# Patient Record
Sex: FEMALE | URBAN - NONMETROPOLITAN AREA
[De-identification: names, ages, dates, MRNs, and addresses within clinical notes are randomized per-mention and may not be internally consistent; named-entity substitution may affect disease eponyms.]

---

## 2023-01-13 ENCOUNTER — PROCEDURE VISIT (OUTPATIENT)
Dept: ENT CLINIC | Age: 43
End: 2023-01-13
Payer: MEDICAID

## 2023-01-13 DIAGNOSIS — H93.A2 PULSATILE TINNITUS OF LEFT EAR: Primary | ICD-10-CM

## 2023-01-13 PROCEDURE — 92550 TYMPANOMETRY & REFLEX THRESH: CPT | Performed by: AUDIOLOGIST

## 2023-01-13 PROCEDURE — 92553 AUDIOMETRY AIR & BONE: CPT | Performed by: AUDIOLOGIST

## 2023-01-13 NOTE — PROGRESS NOTES
History   Natan Nunez is a 38 y.o. female who presented to the clinic this date with complaints of decreased hearing in her left ear. She reported onset several years ago. She reported symptoms are worse in noise. She also noted pulsatile tinnitus in her left ear which resolves when pushing on her neck. She has had vascular studies which she reported were normal. She reported vertigo with initial onset but denied recent problems. Summary   Immitance measures are consistent with normal middle ear function bilaterally. Pure tone testing indicates normal hearing bilaterally. Results   Otoscopy:   Right: Clear EAC/Normal TM  Left: Clear EAC/Normal TM    Audiometry:   Right: Hearing WNL    Left: Hearing WNL           Tympanometry:    Right: Type A  Left: Type A    Acoustic Reflex Thresholds:     0.5 kHz 1 kHz 2 kHz  0.5 kHz 1 kHz 2 kHz   Right Ipsilateral  Probe Right, Stim Right Present Present Present Left Ipsilateral  Probe Left, Stim Left  Present  Present  Present   Left Contralateral  Probe Right, Stim Left Present Present Present Right Contralateral  Probe Left, Stim Right  Present  Present  Present       Plan   Results of today's testing were discussed with Ms. Judie Keenan and the following recommendations were made: Follow up with ENT as scheduled.         Audiogram and Acoustic Immittance

## 2023-02-09 ENCOUNTER — OFFICE VISIT (OUTPATIENT)
Dept: ENT CLINIC | Age: 43
End: 2023-02-09
Payer: MEDICAID

## 2023-02-09 VITALS
HEIGHT: 62 IN | BODY MASS INDEX: 28.89 KG/M2 | SYSTOLIC BLOOD PRESSURE: 122 MMHG | WEIGHT: 157 LBS | DIASTOLIC BLOOD PRESSURE: 70 MMHG

## 2023-02-09 DIAGNOSIS — H93.A2 PULSATILE TINNITUS OF LEFT EAR: Primary | ICD-10-CM

## 2023-02-09 PROCEDURE — G8484 FLU IMMUNIZE NO ADMIN: HCPCS | Performed by: PHYSICIAN ASSISTANT

## 2023-02-09 PROCEDURE — 4004F PT TOBACCO SCREEN RCVD TLK: CPT | Performed by: PHYSICIAN ASSISTANT

## 2023-02-09 PROCEDURE — G8427 DOCREV CUR MEDS BY ELIG CLIN: HCPCS | Performed by: PHYSICIAN ASSISTANT

## 2023-02-09 PROCEDURE — G8419 CALC BMI OUT NRM PARAM NOF/U: HCPCS | Performed by: PHYSICIAN ASSISTANT

## 2023-02-09 PROCEDURE — 99203 OFFICE O/P NEW LOW 30 MIN: CPT | Performed by: PHYSICIAN ASSISTANT

## 2023-02-09 RX ORDER — DEXTROAMPHETAMINE SACCHARATE, AMPHETAMINE ASPARTATE, DEXTROAMPHETAMINE SULFATE AND AMPHETAMINE SULFATE 5; 5; 5; 5 MG/1; MG/1; MG/1; MG/1
TABLET ORAL
COMMUNITY
Start: 2023-01-18

## 2023-02-09 ASSESSMENT — ENCOUNTER SYMPTOMS
FACIAL SWELLING: 0
EYE DISCHARGE: 0
SORE THROAT: 0
VOICE CHANGE: 0
RHINORRHEA: 0
SINUS PRESSURE: 0
EYE PAIN: 0
TROUBLE SWALLOWING: 0
SINUS PAIN: 0

## 2023-02-09 NOTE — PROGRESS NOTES
[facility] OTOLARYNGOLOGY/ENT  Ms. Maria Del Carmen Douglas is a pleasant 40-year-old  female that was referred by Dr. Manda Kimball due to problems with pulsatile tinnitus in the left ear. Patient reports that she initially underwent ultrasound of the carotids that demonstrated minimal disease with no stenosis. Due to this persisting she underwent audiology studies by [facility] on January 13, 2023. This demonstrated normal hearing with no deficit. Tympanogram was type A bilaterally. Patient reports that she experienced vertigo for about a week or 2 after the onset of the pulsatile tinnitus which was about 2-1/2 3 years ago. Since then she has had no issues with vertigo or any issues with headaches or vision changes. Allergies: Patient has no known allergies. Current Outpatient Medications   Medication Sig Dispense Refill    amphetamine-dextroamphetamine (ADDERALL) 20 MG tablet TAKE 1 TABLET BY MOUTH THREE TIMES A DAY FILL ON 1/18       No current facility-administered medications for this visit. Past Surgical History:   Procedure Laterality Date    CHOLECYSTECTOMY         Past Medical History:   Diagnosis Date    ADHD     Chronic pain        Family History   Problem Relation Age of Onset    Cancer Maternal Aunt     Cancer Paternal Grandfather        Social History     Tobacco Use    Smoking status: Never    Smokeless tobacco: Not on file   Substance Use Topics    Alcohol use: Not Currently           REVIEW OF SYSTEMS:  all other systems reviewed and are negative  Review of Systems   Constitutional:  Negative for chills and fever. HENT:  Negative for congestion, dental problem, ear discharge, ear pain, facial swelling, hearing loss, nosebleeds, postnasal drip, rhinorrhea, sinus pressure, sinus pain, sneezing, sore throat, tinnitus, trouble swallowing and voice change. Eyes:  Negative for pain and discharge. Neurological:  Negative for dizziness and headaches.          Comments:     PHYSICAL EXAM:    BP 122/70   Ht 5' 2\" (1.575 m)   Wt 157 lb (71.2 kg)   BMI 28.72 kg/m²   Body mass index is 28.72 kg/m². General Appearance: well developed  and well nourished  Head/ Face: normocephalic and atraumatic  Vocal Quality: good/ normal  Ears: Right Ear: External: external ears normal Otoscopy Ear Canal: canal clear Otoscopy TM: TM's normal and TM's mobile Left Ear: External: external ears normal Otoscopy Ear Canal: canal clear Otoscopy TM: TM's normal and TM's mobile  Hearing: grossly intact  Nose: nares normal and septum midline  Neck: supple and adenopathy none palpable  Thyroid: normal  Oral exam demonstrated the tongue to be midline with no abnormalities to the posterior pharynx. Patient was noted to have no evidence of carotid bruits to auscultation bilaterally. Assessment & Plan:    Problem List Items Addressed This Visit       Pulsatile tinnitus of left ear - Primary     Pulsatile tinnitus of the left ear  Plan: I have recommended an MRI of the IAC for further evaluation. I will call her the results with further recommendations to follow. If the MRI is negative, would consider adjusting her ADHD meds due to this being a stimulant. Relevant Orders    MRI IAC POSTERIOR FOSSA W WO CONTRAST       Orders Placed This Encounter   Procedures    MRI IAC POSTERIOR FOSSA W WO CONTRAST     Standing Status:   Future     Standing Expiration Date:   2/9/2024     Scheduling Instructions:      LMP     Order Specific Question:   STAT Creatinine as needed:     Answer:   No     Order Specific Question:   Reason for exam:     Answer:   Pulsatile tinnitus of the left ear     Order Specific Question:   What is the sedation requirement? Answer:   None       No orders of the defined types were placed in this encounter. Electronically signed by Connie Whitfield PA-C on 2/9/23 at 3:39 PM CST        Please note that this chart was generated using dragon dictation software.   Although every effort was made to ensure the accuracy of this automated transcription, some errors in transcription may have occurred.

## 2023-02-09 NOTE — ASSESSMENT & PLAN NOTE
Pulsatile tinnitus of the left ear  Plan: I have recommended an MRI of the IAC for further evaluation. I will call her the results with further recommendations to follow. If the MRI is negative, would consider adjusting her ADHD meds due to this being a stimulant.

## 2023-02-27 ENCOUNTER — HOSPITAL ENCOUNTER (OUTPATIENT)
Dept: MRI IMAGING | Age: 43
Discharge: HOME | End: 2023-02-27
Payer: MEDICAID

## 2023-02-27 ENCOUNTER — TELEPHONE (OUTPATIENT)
Dept: ENT CLINIC | Age: 43
End: 2023-02-27

## 2023-02-27 DIAGNOSIS — H93.A2 PULSATILE TINNITUS OF LEFT EAR: ICD-10-CM

## 2023-02-27 PROCEDURE — 70553 MRI BRAIN STEM W/O & W/DYE: CPT

## 2023-02-27 PROCEDURE — 6360000004 HC RX CONTRAST MEDICATION: Performed by: PHYSICIAN ASSISTANT

## 2023-02-27 PROCEDURE — A9577 INJ MULTIHANCE: HCPCS | Performed by: PHYSICIAN ASSISTANT

## 2023-02-27 PROCEDURE — 70553 MRI BRAIN STEM W/O & W/DYE: CPT | Performed by: RADIOLOGY

## 2023-02-27 RX ADMIN — GADOBENATE DIMEGLUMINE 14.5 ML: 529 INJECTION, SOLUTION INTRAVENOUS at 13:45

## 2023-02-28 NOTE — TELEPHONE ENCOUNTER
I called the MRI of the IAC report to the patient's cell phone voicemail. Patient was noted with no evidence of an acoustic neuroma or any abnormalities. Advised the patient to try background noise as far as a white noise machine/fan/radio or TV to help sleep at night. I also advised the patient that she may consider adjusting her ADHD medicines if she has had a recent change in milligram strength. She was reminded to call if she has any questions or problems.       Electronically signed by Connie Whitfield PA-C on 2/27/23 at 6:12 PM CST